# Patient Record
(demographics unavailable — no encounter records)

---

## 2024-12-14 NOTE — ASSESSMENT
[FreeTextEntry1] : 12 year old with long-standing asymmetry of size (girth) of thigh/leg and post concussion headaches (now resolved), found to have a L parietal arachnoid cyst on head CT/brain MRI (during workup for headaches), stable on recent repeat brain MRI. Normal MRI of lumbar spine. PLAN: Abortive headache Tx: Ibuprofen 400 mg q 8 hrs prn or Aleve 440 mg q 12 hrs prn; or Naproxen 500 mg q 12 hrs prn (take with food prevent gastric irritation) Side effects and potential to cause rebound headaches with frequent use (> twice a week) discussed with parent Keep HA diary to document  frequency, identify triggers and response to medication Behavioral measures to avoid headaches (maintaining regular eating and sleeping habits, avoiding known triggers) discussed with parent and patient Will see back in 6 months

## 2024-12-14 NOTE — PHYSICAL EXAM
[Well-appearing] : well-appearing [Conversant] : conversant [Normal speech and language] : normal speech and language [Full extraocular movements] : full extraocular movements [No nystagmus] : no nystagmus [No facial asymmetry or weakness] : no facial asymmetry or weakness [Normal axial and appendicular muscle tone] : normal axial and appendicular muscle tone [Gets up on table without difficulty] : gets up on table without difficulty [No pronator drift] : no pronator drift [No abnormal involuntary movements] : no abnormal involuntary movements [5/5 strength in proximal and distal muscles of arms and legs] : 5/5 strength in proximal and distal muscles of arms and legs [2+ biceps] : 2+ biceps [Knee jerks] : knee jerks [Localizes LT and temperature] : localizes LT and temperature [No dysmetria on FTNT] : no dysmetria on FTNT [Normal gait] : normal gait [Able to tandem well] : able to tandem well [Negative Romberg] : negative Romberg

## 2024-12-14 NOTE — HISTORY OF PRESENT ILLNESS
[FreeTextEntry1] : No longer having headaches. Last significant headache was during an illness a month ago Headaches respond to Ibuprofen No change in arachnoid cyst on MRI

## 2024-12-14 NOTE — REASON FOR VISIT
[Follow-Up Evaluation] : a follow-up evaluation for [Headache] : headache [FreeTextEntry2] : arachnoid cyst [Mother] : mother

## 2025-05-01 NOTE — ASSESSMENT
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 Unclear if underlying genetic syndrome such as Klippel-Trenaunay vs CLOVES vs other PIK3CA mutation syndrome - photos in chart for monitoring - Pt initially presented in 2021 to Dr. Motta, then referred to Dr. Kauffman 12/2021  - Had testing for Adelina Widmann syndrome in 2021 which was negative  - MRI 4/2022 revealed lymphedema of the left lower extremity and labia, MRI 6/2022 without any evidence of obstruction. MR head 6/2022 showed arachnoid cyst  - Saw pediatric orthopedic surgery 10/2022 and 6/2024, reports no issues - Recently saw a physical therapist (Dr. Michaela Kaminski, DPT) who recommended specialized leg compression garments and massage therapy  - Discussed utility of pursuing skin biopsy for genetic testing. Will refer to pediatric heme/onc (Dr. Kavita Lezama) for evaluation prior to pursuing biopsy- if PIK3CA related has option for targeted therapy, alpelisib. Referral placed in system and office phone number provided, [facility] emailed.   RTC Dr. Kauffman after following with peds heme/onc

## 2025-05-01 NOTE — HISTORY OF PRESENT ILLNESS
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 No pain or difficulty/discomfort with walking/activity. No pain in the legs or hips. Saw pediatric orthopedics 6/2024. Did not see peds heme/onc   Hx: - larger LLE and red spots on L buttocks x years - prev underwent genetic testing which was negative for microarray and BWS methylation study. saw genetics Nov 2021 (unclear whether that was a separate visit) and had testing for maggy wiedemann which was negative, but did not test for other syndrome - had MRI 4/2022 which showed LLE lymphedema - had MRI 6/2022 of abdomen and limited pelvis which did not show any obstruction

## 2025-05-01 NOTE — PHYSICAL EXAM
[Alert] : alert [FreeTextEntry3] : discrete purple and pale red vascular plaques on L buttock and lower back/flank L leg edematous and larger than R, mid-thigh of R 21.5 cm, L 24 cm

## 2025-05-15 NOTE — HISTORY OF PRESENT ILLNESS
[FreeTextEntry1] : Red on back and larger leg. with mother.  [de-identified] :  on phone.  Denies any complaints. Mother wants to know what the problem is and how to treat it.  Extensive review of notes and tests and summarized for patient and mother.

## 2025-05-15 NOTE — ASSESSMENT
[FreeTextEntry1] : Alert, oriented, well, pleasant.  violaceous and erythematous well-demarcated patches left lower back. left thigh visibly thicker than right. Photos of progression reviewed.  Explained the nature disease and need to follow up with Dr Tomlin's office.  Dermablend recommended for cosmetic concern of visible color change.